# Patient Record
Sex: FEMALE | Race: WHITE | NOT HISPANIC OR LATINO | URBAN - METROPOLITAN AREA
[De-identification: names, ages, dates, MRNs, and addresses within clinical notes are randomized per-mention and may not be internally consistent; named-entity substitution may affect disease eponyms.]

---

## 2025-06-27 ENCOUNTER — OFFICE VISIT (OUTPATIENT)
Dept: URGENT CARE | Age: 45
End: 2025-06-27
Payer: COMMERCIAL

## 2025-06-27 VITALS
WEIGHT: 200 LBS | SYSTOLIC BLOOD PRESSURE: 139 MMHG | DIASTOLIC BLOOD PRESSURE: 97 MMHG | BODY MASS INDEX: 33.32 KG/M2 | HEIGHT: 65 IN | RESPIRATION RATE: 18 BRPM | TEMPERATURE: 98.9 F | OXYGEN SATURATION: 97 % | HEART RATE: 98 BPM

## 2025-06-27 DIAGNOSIS — L02.91 ABSCESS: Primary | ICD-10-CM

## 2025-06-27 ASSESSMENT — PATIENT HEALTH QUESTIONNAIRE - PHQ9
2. FEELING DOWN, DEPRESSED OR HOPELESS: NOT AT ALL
SUM OF ALL RESPONSES TO PHQ9 QUESTIONS 1 AND 2: 0
1. LITTLE INTEREST OR PLEASURE IN DOING THINGS: NOT AT ALL

## 2025-06-27 ASSESSMENT — ENCOUNTER SYMPTOMS
CONSTITUTIONAL NEGATIVE: 1
PSYCHIATRIC NEGATIVE: 1
DEPRESSION: 0
OCCASIONAL FEELINGS OF UNSTEADINESS: 0
GASTROINTESTINAL NEGATIVE: 1
CARDIOVASCULAR NEGATIVE: 1
MUSCULOSKELETAL NEGATIVE: 1
ROS SKIN COMMENTS: ABSCESS
RESPIRATORY NEGATIVE: 1
LOSS OF SENSATION IN FEET: 0

## 2025-06-27 NOTE — PROGRESS NOTES
"Subjective   Patient ID: Rachel Major is a 45 y.o. female. They present today with a chief complaint of Abscess (Pt c/o Hemorid or abscess/).    History of Present Illness  45-year-old female comes in today with a chief complaint of abscess on her right buttocks.  Patient stated that it is getting very large and has been present for approximately 1 week.  She has had intermittent fevers over the last 3 days as well.  She has difficulty sitting secondary to pain.  She tried taking medication and delaying treatment, but states that that is not working.  She denies any chest pain or shortness of breath, nausea/vomiting/diarrhea.  Sitting on it appears to make it worse and nothing appears to make it better.  She has tried some other over-the-counter medications with little to no relief.  She last took Tylenol approximately 1 hour ago.      Abscess      Past Medical History  Allergies as of 06/27/2025 - Reviewed 06/27/2025   Allergen Reaction Noted    Peanut Cough, Itching, Rash, and Nausea/vomiting 12/12/2022       Prescriptions Prior to Admission[1]     Medical History[2]    Surgical History[3]     reports that she has never smoked. She has never used smokeless tobacco.    Review of Systems  Review of Systems   Constitutional: Negative.    HENT: Negative.     Respiratory: Negative.     Cardiovascular: Negative.    Gastrointestinal: Negative.    Genitourinary: Negative.    Musculoskeletal: Negative.    Skin:         Abscess   Psychiatric/Behavioral: Negative.     All other systems reviewed and are negative.                                 Objective    Vitals:    06/27/25 1435   BP: (!) 139/97   Pulse: 98   Resp: 18   Temp: 37.2 °C (98.9 °F)   TempSrc: Oral   SpO2: 97%   Weight: 90.7 kg (200 lb)   Height: 1.651 m (5' 5\")     No LMP recorded.    Physical Exam  Vitals and nursing note reviewed.   Constitutional:       Appearance: Normal appearance.   HENT:      Head: Normocephalic and atraumatic.      Nose: Nose " normal.   Eyes:      Extraocular Movements: Extraocular movements intact.      Conjunctiva/sclera: Conjunctivae normal.   Cardiovascular:      Rate and Rhythm: Normal rate.      Pulses: Normal pulses.   Pulmonary:      Effort: Pulmonary effort is normal.   Abdominal:      General: Abdomen is flat.   Skin:     Comments: Patient has a very large abscess seen on the right buttocks near the midline.  Measuring approximately 8 cm x 2 cm.  It is firm and painful to palpation.   Neurological:      General: No focal deficit present.      Mental Status: She is alert and oriented to person, place, and time.   Psychiatric:         Mood and Affect: Mood normal.         Behavior: Behavior normal.         Procedures    Point of Care Test & Imaging Results from this visit  No results found for this visit on 06/27/25.   Imaging  No results found.    Cardiology, Vascular, and Other Imaging  No other imaging results found for the past 2 days      Diagnostic study results (if any) were reviewed by Abdoulaye Bonner PA-C.    Assessment/Plan   Allergies, medications, history, and pertinent labs/EKGs/Imaging reviewed by Abdoulaye Bonner PA-C.     Medical Decision Making  45-year-old female who comes in today with a chief complaint of abscess on her right buttocks.  Patient has had this abscess that has been continuously growing over the last week or so.  She took some doxycycline that she had at home to see if it would help and she believes that that is overall delayed her treatment.  It is only gotten worse.  In the last 3 days she has also developed a fever.  She does not have a fever here, but took Tylenol approximately 2 hours ago.  Due to the overall size measuring approximately 8 cm x 2 cm and the location and possible depth, as well as recent fever, I suggested that she go to the emergency room for treatment.  Patient understood and agreed.  Patient is stable for discharge and will go to the emergency room for further care and  treatment.    Orders and Diagnoses  There are no diagnoses linked to this encounter.    Medical Admin Record      Patient disposition: ED    Electronically signed by Abdoulaye Bonner PA-C  3:19 PM           [1] (Not in a hospital admission)  [2] No past medical history on file.  [3]   Past Surgical History:  Procedure Laterality Date    OTHER SURGICAL HISTORY  06/27/2022    No history of surgery

## 2025-06-28 ENCOUNTER — TELEPHONE (OUTPATIENT)
Dept: URGENT CARE | Age: 45
End: 2025-06-28